# Patient Record
Sex: MALE | Race: OTHER | HISPANIC OR LATINO | URBAN - METROPOLITAN AREA
[De-identification: names, ages, dates, MRNs, and addresses within clinical notes are randomized per-mention and may not be internally consistent; named-entity substitution may affect disease eponyms.]

---

## 2017-04-12 ENCOUNTER — INPATIENT (INPATIENT)
Facility: HOSPITAL | Age: 27
LOS: 3 days | Discharge: HOME | DRG: 165 | End: 2017-04-16
Attending: THORACIC SURGERY (CARDIOTHORACIC VASCULAR SURGERY) | Admitting: THORACIC SURGERY (CARDIOTHORACIC VASCULAR SURGERY)
Payer: COMMERCIAL

## 2017-04-12 VITALS
HEART RATE: 86 BPM | DIASTOLIC BLOOD PRESSURE: 77 MMHG | OXYGEN SATURATION: 93 % | RESPIRATION RATE: 16 BRPM | SYSTOLIC BLOOD PRESSURE: 133 MMHG | TEMPERATURE: 98 F

## 2017-04-12 DIAGNOSIS — J93.9 PNEUMOTHORAX, UNSPECIFIED: ICD-10-CM

## 2017-04-12 LAB
ALBUMIN SERPL ELPH-MCNC: 4.2 G/DL — SIGNIFICANT CHANGE UP (ref 3.4–5)
ALP SERPL-CCNC: 73 U/L — SIGNIFICANT CHANGE UP (ref 40–120)
ALT FLD-CCNC: 24 U/L — SIGNIFICANT CHANGE UP (ref 12–42)
ANION GAP SERPL CALC-SCNC: 8 MMOL/L — LOW (ref 9–16)
APTT BLD: 30.1 SEC — SIGNIFICANT CHANGE UP (ref 27.5–37.4)
AST SERPL-CCNC: 23 U/L — SIGNIFICANT CHANGE UP (ref 15–37)
BASOPHILS NFR BLD AUTO: 0.9 % — SIGNIFICANT CHANGE UP (ref 0–2)
BILIRUB SERPL-MCNC: 0.9 MG/DL — SIGNIFICANT CHANGE UP (ref 0.2–1.2)
BUN SERPL-MCNC: 15 MG/DL — SIGNIFICANT CHANGE UP (ref 7–23)
CALCIUM SERPL-MCNC: 9 MG/DL — SIGNIFICANT CHANGE UP (ref 8.5–10.5)
CHLORIDE SERPL-SCNC: 104 MMOL/L — SIGNIFICANT CHANGE UP (ref 96–108)
CK MB CFR SERPL CALC: <1 NG/ML — SIGNIFICANT CHANGE UP (ref 0.5–3.6)
CK SERPL-CCNC: 108 U/L — SIGNIFICANT CHANGE UP (ref 39–308)
CO2 SERPL-SCNC: 27 MMOL/L — SIGNIFICANT CHANGE UP (ref 22–31)
CREAT SERPL-MCNC: 0.96 MG/DL — SIGNIFICANT CHANGE UP (ref 0.5–1.3)
EOSINOPHIL NFR BLD AUTO: 9.7 % — HIGH (ref 0–6)
EXTRA BLUE TOP TUBE: SIGNIFICANT CHANGE UP
GLUCOSE SERPL-MCNC: 114 MG/DL — HIGH (ref 70–99)
HCT VFR BLD CALC: 38.4 % — LOW (ref 39–50)
HGB BLD-MCNC: 13.4 G/DL — SIGNIFICANT CHANGE UP (ref 13–17)
INR BLD: 0.96 — SIGNIFICANT CHANGE UP (ref 0.88–1.16)
LYMPHOCYTES # BLD AUTO: 42.1 % — SIGNIFICANT CHANGE UP (ref 13–44)
MCHC RBC-ENTMCNC: 31.9 PG — SIGNIFICANT CHANGE UP (ref 27–34)
MCHC RBC-ENTMCNC: 34.9 G/DL — SIGNIFICANT CHANGE UP (ref 32–36)
MCV RBC AUTO: 91.4 FL — SIGNIFICANT CHANGE UP (ref 80–100)
MONOCYTES NFR BLD AUTO: 7 % — SIGNIFICANT CHANGE UP (ref 2–14)
NEUTROPHILS NFR BLD AUTO: 40.3 % — LOW (ref 43–77)
PLATELET # BLD AUTO: 232 K/UL — SIGNIFICANT CHANGE UP (ref 150–400)
POTASSIUM SERPL-MCNC: 4 MMOL/L — SIGNIFICANT CHANGE UP (ref 3.5–5.3)
POTASSIUM SERPL-SCNC: 4 MMOL/L — SIGNIFICANT CHANGE UP (ref 3.5–5.3)
PROT SERPL-MCNC: 7.6 G/DL — SIGNIFICANT CHANGE UP (ref 6.4–8.2)
PROTHROM AB SERPL-ACNC: 10.7 SEC — SIGNIFICANT CHANGE UP (ref 9.8–12.7)
RBC # BLD: 4.2 M/UL — SIGNIFICANT CHANGE UP (ref 4.2–5.8)
RBC # FLD: 12.7 % — SIGNIFICANT CHANGE UP (ref 10.3–16.9)
SODIUM SERPL-SCNC: 139 MMOL/L — SIGNIFICANT CHANGE UP (ref 135–145)
TROPONIN I SERPL-MCNC: <0.015 NG/ML — SIGNIFICANT CHANGE UP (ref 0.01–0.04)
WBC # BLD: 5.5 K/UL — SIGNIFICANT CHANGE UP (ref 3.8–10.5)
WBC # FLD AUTO: 5.5 K/UL — SIGNIFICANT CHANGE UP (ref 3.8–10.5)

## 2017-04-12 PROCEDURE — 32650 THORACOSCOPY W/PLEURODESIS: CPT | Mod: AS

## 2017-04-12 PROCEDURE — 99291 CRITICAL CARE FIRST HOUR: CPT | Mod: 25

## 2017-04-12 PROCEDURE — 32666 THORACOSCOPY W/WEDGE RESECT: CPT | Mod: AS

## 2017-04-12 PROCEDURE — 93010 ELECTROCARDIOGRAM REPORT: CPT | Mod: NC

## 2017-04-12 PROCEDURE — 71250 CT THORAX DX C-: CPT | Mod: 26

## 2017-04-12 PROCEDURE — 71010: CPT | Mod: 26,59

## 2017-04-12 PROCEDURE — 71020: CPT | Mod: 26

## 2017-04-12 RX ORDER — ACETAMINOPHEN 500 MG
650 TABLET ORAL EVERY 6 HOURS
Qty: 0 | Refills: 0 | Status: DISCONTINUED | OUTPATIENT
Start: 2017-04-12 | End: 2017-04-14

## 2017-04-12 RX ORDER — KETOROLAC TROMETHAMINE 30 MG/ML
15 SYRINGE (ML) INJECTION EVERY 6 HOURS
Qty: 0 | Refills: 0 | Status: DISCONTINUED | OUTPATIENT
Start: 2017-04-12 | End: 2017-04-14

## 2017-04-12 RX ORDER — SODIUM CHLORIDE 9 MG/ML
3 INJECTION INTRAMUSCULAR; INTRAVENOUS; SUBCUTANEOUS EVERY 8 HOURS
Qty: 0 | Refills: 0 | Status: DISCONTINUED | OUTPATIENT
Start: 2017-04-12 | End: 2017-04-14

## 2017-04-12 RX ORDER — KETOROLAC TROMETHAMINE 30 MG/ML
30 SYRINGE (ML) INJECTION EVERY 6 HOURS
Qty: 0 | Refills: 0 | Status: DISCONTINUED | OUTPATIENT
Start: 2017-04-12 | End: 2017-04-14

## 2017-04-12 RX ORDER — FAMOTIDINE 10 MG/ML
20 INJECTION INTRAVENOUS
Qty: 0 | Refills: 0 | Status: DISCONTINUED | OUTPATIENT
Start: 2017-04-12 | End: 2017-04-14

## 2017-04-12 RX ORDER — KETOROLAC TROMETHAMINE 30 MG/ML
15 SYRINGE (ML) INJECTION ONCE
Qty: 0 | Refills: 0 | Status: DISCONTINUED | OUTPATIENT
Start: 2017-04-12 | End: 2017-04-12

## 2017-04-12 RX ADMIN — Medication 15 MILLIGRAM(S): at 11:24

## 2017-04-12 RX ADMIN — Medication 15 MILLIGRAM(S): at 17:49

## 2017-04-12 RX ADMIN — Medication 15 MILLIGRAM(S): at 21:15

## 2017-04-12 RX ADMIN — Medication 15 MILLIGRAM(S): at 18:04

## 2017-04-12 RX ADMIN — Medication 15 MILLIGRAM(S): at 21:41

## 2017-04-12 RX ADMIN — SODIUM CHLORIDE 3 MILLILITER(S): 9 INJECTION INTRAMUSCULAR; INTRAVENOUS; SUBCUTANEOUS at 21:00

## 2017-04-12 RX ADMIN — SODIUM CHLORIDE 3 MILLILITER(S): 9 INJECTION INTRAMUSCULAR; INTRAVENOUS; SUBCUTANEOUS at 16:08

## 2017-04-12 RX ADMIN — FAMOTIDINE 20 MILLIGRAM(S): 10 INJECTION INTRAVENOUS at 17:37

## 2017-04-12 RX ADMIN — Medication 15 MILLIGRAM(S): at 11:39

## 2017-04-12 NOTE — ED ADULT NURSE REASSESSMENT NOTE - NS ED NURSE REASSESS COMMENT FT1
Pt. was confirmed by xray to have pneumothorax, pt. was moved to resus room, MD Black and MD Solo at bedside, preprocedure checklist verified, VS stable, pt. is A&Ox3, calm, cooperative, CM in progress, O2 therapy via non-rebreather, pt. is being prepped for chest tube insertion, pt. tolerating well, will monitor.

## 2017-04-12 NOTE — ED PROVIDER NOTE - CRITICAL CARE PROVIDED
additional history taking/consultation with other physicians/direct patient care (not related to procedure)/documentation/interpretation of diagnostic studies

## 2017-04-12 NOTE — H&P ADULT - NSHPREVIEWOFSYSTEMS_GEN_ALL_CORE
Review of Systems  CONSTITUTIONAL:  Denies Fevers / chills, sweats, fatigue, weight loss, weight gain                                      NEURO:  Denies parathesias, seizures, syncope, confusion                                                                                EYES:  Denies Blurry vision, discharge, pain, loss of vision                                                                                    ENMT:  Denies Difficulty hearing, vertigo, dysphagia, epistaxis, recent dental work                                       CV:  + LEFT SIDED Sudden onset Chest pain. +RAPHAEL.  DENIES palpitations and  orthopnea                                                                             RESPIRATORY:  + SOB, Denies Wheezing,  cough / sputum, hemoptysis                                                                GI:  Denies Nausea, vommiting, diarrhea, constipation, melena, difficulty swallowing                                               : Denies Hematuria, dysuria, urgency, incontinence                                                                                         MUSKULOSKELETAL:  Denies arthritis, joint swelling, muscle weakness                                                             SKIN/BREAST:  Denies rash, itching, judy loss, masses                                                                                            PSYCH:  Denies depresion, anxiety, suicidal ideation                                                                                               HEME/LYMPH:  Denies bruises easily, enlarged lymph nodes, tender lymph nodes                                        ENDOCRINE:  Denies cold intolerance, heat intolerance, polydipsia

## 2017-04-12 NOTE — H&P ADULT - NSHPPHYSICALEXAM_GEN_ALL_CORE
Physical Exam  CONSTITUTIONAL:   AxOx3.  NAD On 100 NRB breathing comfortably.  NEURO:    Nonfocal               ENM:   MMM  CV:    S1S2, RRR. No R/M/G  RESP:  Absent breath sounds Lt.  Rt is CTA.  GI:  +BS, soft, NT/ND  : WNL  MUSKULOSKELETAL:   WNL  SKIN / BREAST:    WNL

## 2017-04-12 NOTE — ED PROVIDER NOTE - OBJECTIVE STATEMENT
27 y/o m with no pmh presents to ED c/o left sided sudden chest pain with sob radiating to his back. Healthy young man denies any recent URI, cough, fever, n,v, abd pain, PE, cardiac history, trauma, injury to the chest. Denies drug use.

## 2017-04-12 NOTE — ED ADULT TRIAGE NOTE - CHIEF COMPLAINT QUOTE
pt c/o 7/10 left sided CP radiating to the back with SOB since 630 this morning. Denies N/V palpitations or PMH

## 2017-04-12 NOTE — ED ADULT NURSE REASSESSMENT NOTE - NS ED NURSE REASSESS COMMENT FT1
Ct scan was done, pt. tolerated well, pt. is A&Ox3, breathing unlabored, no c/o pain, VS stable, cardiac monitor, suctioning, O2 via non-rebreather in progress, waiting for transporter to be transferred to admitting unit.

## 2017-04-12 NOTE — H&P ADULT - NSHPSOCIALHISTORY_GEN_ALL_CORE
Social History  Smoker:    NO         ETOH Use:   YES: Social  Ilicit Drug Use:   NO  Occupation:    Assistant Devices:   None   Lives with: Alone

## 2017-04-12 NOTE — ED ADULT NURSE REASSESSMENT NOTE - NS ED NURSE REASSESS COMMENT FT1
Tube insertion following sterile procedure guidelines completed, pigtale cath inserted Lt side 4th intercostal, connected to continuous suctioning, less than 1cc of serosanguinous fluid drained at present time, pt. tolerated procedure well, pt. is A&Ox3, cooperative, calm, communicates w/o difficulty, 100% O2 therapy and cardiac monitor continuous, VS stable, xray confirmed placement, will monitor.

## 2017-04-12 NOTE — ED ADULT NURSE NOTE - OBJECTIVE STATEMENT
Patient BIBA stating, "I was getting out of the shower this morning around 6:30 and I started having chest pain."  Patient is A&Ox3, in no visible acute distress, complaining of left sided 6/10 chest pain that increases with inspiration with radiation to back and SOB.  Patient denies any diaphoresis, dizziness, nausea, vomiting, diarrhea, fevers, chills or cough.  No PMH or home meds.  PIV placed, labs drawn and sent, EKG done, placed on cardiac monitor.  Will continue with plan of care. Patient BIBA stating, "I was getting out of the shower this morning around 6:30 and I started having chest pain."  Patient is A&Ox3, in no visible acute distress, complaining of left sided 6/10 chest pain that increases with inspiration with radiation to back and SOB.  Patient denies any diaphoresis, dizziness, nausea, vomiting, diarrhea, fevers, chills or cough.  Diminished breath sounds noted on L>R.  No PMH or home meds.  PIV placed, labs drawn and sent, EKG done, placed on cardiac monitor.  Will continue with plan of care.

## 2017-04-12 NOTE — ED ADULT NURSE REASSESSMENT NOTE - NS ED NURSE REASSESS COMMENT FT1
Chest tube insertion in progress, lidocaine 2% used for local anaesthesia, pt. tolerating well, sterile field maintained at all times, VS WNL.

## 2017-04-12 NOTE — ED PROVIDER NOTE - ATTENDING CONTRIBUTION TO CARE
25 yo male no pmh c/o acute onset L sided cp and sob.  No trauma, cough, fever, le pain/swelling, recent immobilization, tob use.  Well appearing, nad, nc/at, lung cta R, no bs L, heart reg, abd soft/nt, ext no c/c/e, no gross neuro deficits.  CXR w complete L ptx.  CT surg consulted and placed pigtail catheter; pt admitted to CT surg.

## 2017-04-12 NOTE — H&P ADULT - NSHPLABSRESULTS_GEN_ALL_CORE
Labs:                        13.4   5.5   )-----------( 232      ( 12 Apr 2017 07:51 )             38.4     04-12    139  |  104  |  15  ----------------------------<  114<H>  4.0   |  27  |  0.96    Ca    9.0      12 Apr 2017 07:51    TPro  7.6  /  Alb  4.2  /  TBili  0.9  /  DBili  x   /  AST  23  /  ALT  24  /  AlkPhos  73  04-12    PT/INR - ( 12 Apr 2017 09:20 )   PT: 10.7 sec;   INR: 0.96     PTT - ( 12 Apr 2017 09:20 )  PTT:30.1 sec      CARDIAC MARKERS ( 12 Apr 2017 07:51 )  <0.015 ng/mL / x     / 108 U/L / x     / <1.0 ng/mL

## 2017-04-12 NOTE — H&P ADULT - HISTORY OF PRESENT ILLNESS
27 y/o male with no PMHx presents to ER this am with complaints of sudden onset progressive left sided chest pain this morning.  Pt complaints of progressive worsening SOB, denies fever, HA, N/V, diaphoresis.

## 2017-04-12 NOTE — ED PROVIDER NOTE - MEDICAL DECISION MAKING DETAILS
Patient with sudden onset of pneumothorax ct tube placed by CT surgery. Admitted for observation. Well appearing, NAD and VSS.

## 2017-04-12 NOTE — PROCEDURE NOTE - NSPROCDETAILS_GEN_ALL_CORE
sterile dressing applied/Seldinger technique/secured in place/dressing applied/thoracostomy tube placed percutaneously

## 2017-04-12 NOTE — H&P ADULT - PROBLEM SELECTOR PLAN 1
Admit to Thoracic Surgery.  [address].  Dr. Black  Placement of left pigtail catheter.  CT to suction.  CT Chest non contrast to evaluate for blebs.  GI prophylaxis, DVT prophylaxis. Ambulation. Regular diet.  Continue oxygen supplementation.

## 2017-04-12 NOTE — PROCEDURE NOTE - NSPOSTCAREGUIDE_GEN_A_CORE
Care for catheter as per unit/ICU protocols/Instructed patient/caregiver regarding signs and symptoms of infection/Verbal/written post procedure instructions were given to patient/caregiver/Keep the cast/splint/dressing clean and dry

## 2017-04-13 ENCOUNTER — RESULT REVIEW (OUTPATIENT)
Age: 27
End: 2017-04-13

## 2017-04-13 PROBLEM — Z00.00 ENCOUNTER FOR PREVENTIVE HEALTH EXAMINATION: Status: ACTIVE | Noted: 2017-04-13

## 2017-04-13 LAB
ANION GAP SERPL CALC-SCNC: 7 MMOL/L — LOW (ref 9–16)
BLD GP AB SCN SERPL QL: NEGATIVE — SIGNIFICANT CHANGE UP
BUN SERPL-MCNC: 19 MG/DL — SIGNIFICANT CHANGE UP (ref 7–23)
CALCIUM SERPL-MCNC: 9.3 MG/DL — SIGNIFICANT CHANGE UP (ref 8.5–10.5)
CHLORIDE SERPL-SCNC: 106 MMOL/L — SIGNIFICANT CHANGE UP (ref 96–108)
CO2 SERPL-SCNC: 26 MMOL/L — SIGNIFICANT CHANGE UP (ref 22–31)
CREAT SERPL-MCNC: 0.88 MG/DL — SIGNIFICANT CHANGE UP (ref 0.5–1.3)
GLUCOSE SERPL-MCNC: 100 MG/DL — HIGH (ref 70–99)
HCT VFR BLD CALC: 39.5 % — SIGNIFICANT CHANGE UP (ref 39–50)
HGB BLD-MCNC: 13.4 G/DL — SIGNIFICANT CHANGE UP (ref 13–17)
MAGNESIUM SERPL-MCNC: 2.3 MG/DL — SIGNIFICANT CHANGE UP (ref 1.6–2.4)
MCHC RBC-ENTMCNC: 31.2 PG — SIGNIFICANT CHANGE UP (ref 27–34)
MCHC RBC-ENTMCNC: 33.9 G/DL — SIGNIFICANT CHANGE UP (ref 32–36)
MCV RBC AUTO: 92.1 FL — SIGNIFICANT CHANGE UP (ref 80–100)
PLATELET # BLD AUTO: 242 K/UL — SIGNIFICANT CHANGE UP (ref 150–400)
POTASSIUM SERPL-MCNC: 4.2 MMOL/L — SIGNIFICANT CHANGE UP (ref 3.5–5.3)
POTASSIUM SERPL-SCNC: 4.2 MMOL/L — SIGNIFICANT CHANGE UP (ref 3.5–5.3)
RBC # BLD: 4.29 M/UL — SIGNIFICANT CHANGE UP (ref 4.2–5.8)
RBC # FLD: 12.9 % — SIGNIFICANT CHANGE UP (ref 10.3–16.9)
RH IG SCN BLD-IMP: POSITIVE — SIGNIFICANT CHANGE UP
SODIUM SERPL-SCNC: 139 MMOL/L — SIGNIFICANT CHANGE UP (ref 135–145)
WBC # BLD: 6.9 K/UL — SIGNIFICANT CHANGE UP (ref 3.8–10.5)
WBC # FLD AUTO: 6.9 K/UL — SIGNIFICANT CHANGE UP (ref 3.8–10.5)

## 2017-04-13 PROCEDURE — 94010 BREATHING CAPACITY TEST: CPT | Mod: 26

## 2017-04-13 PROCEDURE — 71010: CPT | Mod: 26

## 2017-04-13 RX ORDER — CHLORHEXIDINE GLUCONATE 213 G/1000ML
15 SOLUTION TOPICAL ONCE
Qty: 0 | Refills: 0 | Status: COMPLETED | OUTPATIENT
Start: 2017-04-13 | End: 2017-04-14

## 2017-04-13 RX ORDER — CHLORHEXIDINE GLUCONATE 213 G/1000ML
1 SOLUTION TOPICAL ONCE
Qty: 0 | Refills: 0 | Status: COMPLETED | OUTPATIENT
Start: 2017-04-13 | End: 2017-04-13

## 2017-04-13 RX ADMIN — Medication 30 MILLIGRAM(S): at 23:58

## 2017-04-13 RX ADMIN — Medication 30 MILLIGRAM(S): at 13:14

## 2017-04-13 RX ADMIN — Medication 30 MILLIGRAM(S): at 06:52

## 2017-04-13 RX ADMIN — Medication 30 MILLIGRAM(S): at 23:55

## 2017-04-13 RX ADMIN — Medication 30 MILLIGRAM(S): at 00:10

## 2017-04-13 RX ADMIN — FAMOTIDINE 20 MILLIGRAM(S): 10 INJECTION INTRAVENOUS at 18:37

## 2017-04-13 RX ADMIN — Medication 30 MILLIGRAM(S): at 00:40

## 2017-04-13 RX ADMIN — Medication 30 MILLIGRAM(S): at 13:56

## 2017-04-13 RX ADMIN — CHLORHEXIDINE GLUCONATE 1 APPLICATION(S): 213 SOLUTION TOPICAL at 22:37

## 2017-04-13 RX ADMIN — SODIUM CHLORIDE 3 MILLILITER(S): 9 INJECTION INTRAMUSCULAR; INTRAVENOUS; SUBCUTANEOUS at 13:58

## 2017-04-13 RX ADMIN — SODIUM CHLORIDE 3 MILLILITER(S): 9 INJECTION INTRAMUSCULAR; INTRAVENOUS; SUBCUTANEOUS at 21:39

## 2017-04-13 RX ADMIN — FAMOTIDINE 20 MILLIGRAM(S): 10 INJECTION INTRAVENOUS at 06:52

## 2017-04-13 RX ADMIN — SODIUM CHLORIDE 3 MILLILITER(S): 9 INJECTION INTRAMUSCULAR; INTRAVENOUS; SUBCUTANEOUS at 06:52

## 2017-04-13 RX ADMIN — Medication 30 MILLIGRAM(S): at 07:20

## 2017-04-14 ENCOUNTER — APPOINTMENT (OUTPATIENT)
Dept: THORACIC SURGERY | Facility: HOSPITAL | Age: 27
End: 2017-04-14

## 2017-04-14 LAB
ANION GAP SERPL CALC-SCNC: 7 MMOL/L — LOW (ref 9–16)
ANION GAP SERPL CALC-SCNC: 8 MMOL/L — LOW (ref 9–16)
APTT BLD: 33.8 SEC — SIGNIFICANT CHANGE UP (ref 27.5–37.4)
BASOPHILS NFR BLD AUTO: 0.2 % — SIGNIFICANT CHANGE UP (ref 0–2)
BLD GP AB SCN SERPL QL: NEGATIVE — SIGNIFICANT CHANGE UP
BUN SERPL-MCNC: 15 MG/DL — SIGNIFICANT CHANGE UP (ref 7–23)
BUN SERPL-MCNC: 17 MG/DL — SIGNIFICANT CHANGE UP (ref 7–23)
CALCIUM SERPL-MCNC: 8.7 MG/DL — SIGNIFICANT CHANGE UP (ref 8.5–10.5)
CALCIUM SERPL-MCNC: 8.9 MG/DL — SIGNIFICANT CHANGE UP (ref 8.5–10.5)
CHLORIDE SERPL-SCNC: 105 MMOL/L — SIGNIFICANT CHANGE UP (ref 96–108)
CHLORIDE SERPL-SCNC: 106 MMOL/L — SIGNIFICANT CHANGE UP (ref 96–108)
CO2 SERPL-SCNC: 26 MMOL/L — SIGNIFICANT CHANGE UP (ref 22–31)
CO2 SERPL-SCNC: 27 MMOL/L — SIGNIFICANT CHANGE UP (ref 22–31)
CREAT SERPL-MCNC: 0.93 MG/DL — SIGNIFICANT CHANGE UP (ref 0.5–1.3)
CREAT SERPL-MCNC: 1.04 MG/DL — SIGNIFICANT CHANGE UP (ref 0.5–1.3)
EOSINOPHIL NFR BLD AUTO: 0.4 % — SIGNIFICANT CHANGE UP (ref 0–6)
GLUCOSE SERPL-MCNC: 106 MG/DL — HIGH (ref 70–99)
GLUCOSE SERPL-MCNC: 97 MG/DL — SIGNIFICANT CHANGE UP (ref 70–99)
HCT VFR BLD CALC: 36.8 % — LOW (ref 39–50)
HCT VFR BLD CALC: 39.8 % — SIGNIFICANT CHANGE UP (ref 39–50)
HGB BLD-MCNC: 12.6 G/DL — LOW (ref 13–17)
HGB BLD-MCNC: 13.5 G/DL — SIGNIFICANT CHANGE UP (ref 13–17)
INR BLD: 1.09 — SIGNIFICANT CHANGE UP (ref 0.88–1.16)
LYMPHOCYTES # BLD AUTO: 3.3 % — LOW (ref 13–44)
MAGNESIUM SERPL-MCNC: 2 MG/DL — SIGNIFICANT CHANGE UP (ref 1.6–2.4)
MCHC RBC-ENTMCNC: 31 PG — SIGNIFICANT CHANGE UP (ref 27–34)
MCHC RBC-ENTMCNC: 31.5 PG — SIGNIFICANT CHANGE UP (ref 27–34)
MCHC RBC-ENTMCNC: 33.9 G/DL — SIGNIFICANT CHANGE UP (ref 32–36)
MCHC RBC-ENTMCNC: 34.2 G/DL — SIGNIFICANT CHANGE UP (ref 32–36)
MCV RBC AUTO: 91.3 FL — SIGNIFICANT CHANGE UP (ref 80–100)
MCV RBC AUTO: 92 FL — SIGNIFICANT CHANGE UP (ref 80–100)
MONOCYTES NFR BLD AUTO: 0.4 % — LOW (ref 2–14)
NEUTROPHILS NFR BLD AUTO: 95.7 % — HIGH (ref 43–77)
PLATELET # BLD AUTO: 211 K/UL — SIGNIFICANT CHANGE UP (ref 150–400)
PLATELET # BLD AUTO: 228 K/UL — SIGNIFICANT CHANGE UP (ref 150–400)
POTASSIUM SERPL-MCNC: 4 MMOL/L — SIGNIFICANT CHANGE UP (ref 3.5–5.3)
POTASSIUM SERPL-MCNC: 4.2 MMOL/L — SIGNIFICANT CHANGE UP (ref 3.5–5.3)
POTASSIUM SERPL-SCNC: 4 MMOL/L — SIGNIFICANT CHANGE UP (ref 3.5–5.3)
POTASSIUM SERPL-SCNC: 4.2 MMOL/L — SIGNIFICANT CHANGE UP (ref 3.5–5.3)
PROTHROM AB SERPL-ACNC: 12.1 SEC — SIGNIFICANT CHANGE UP (ref 9.8–12.7)
RBC # BLD: 4 M/UL — LOW (ref 4.2–5.8)
RBC # BLD: 4.36 M/UL — SIGNIFICANT CHANGE UP (ref 4.2–5.8)
RBC # FLD: 12.1 % — SIGNIFICANT CHANGE UP (ref 10.3–16.9)
RBC # FLD: 12.6 % — SIGNIFICANT CHANGE UP (ref 10.3–16.9)
RH IG SCN BLD-IMP: POSITIVE — SIGNIFICANT CHANGE UP
SODIUM SERPL-SCNC: 139 MMOL/L — SIGNIFICANT CHANGE UP (ref 135–145)
SODIUM SERPL-SCNC: 140 MMOL/L — SIGNIFICANT CHANGE UP (ref 135–145)
WBC # BLD: 15.7 K/UL — HIGH (ref 3.8–10.5)
WBC # BLD: 6.1 K/UL — SIGNIFICANT CHANGE UP (ref 3.8–10.5)
WBC # FLD AUTO: 15.7 K/UL — HIGH (ref 3.8–10.5)
WBC # FLD AUTO: 6.1 K/UL — SIGNIFICANT CHANGE UP (ref 3.8–10.5)

## 2017-04-14 PROCEDURE — 71010: CPT | Mod: 26

## 2017-04-14 PROCEDURE — 71010: CPT | Mod: 26,77

## 2017-04-14 RX ORDER — BUPIVACAINE 13.3 MG/ML
20 INJECTION, SUSPENSION, LIPOSOMAL INFILTRATION ONCE
Qty: 0 | Refills: 0 | Status: DISCONTINUED | OUTPATIENT
Start: 2017-04-14 | End: 2017-04-14

## 2017-04-14 RX ORDER — HEPARIN SODIUM 5000 [USP'U]/ML
5000 INJECTION INTRAVENOUS; SUBCUTANEOUS EVERY 8 HOURS
Qty: 0 | Refills: 0 | Status: DISCONTINUED | OUTPATIENT
Start: 2017-04-14 | End: 2017-04-16

## 2017-04-14 RX ORDER — ACETAMINOPHEN 500 MG
1000 TABLET ORAL ONCE
Qty: 0 | Refills: 0 | Status: COMPLETED | OUTPATIENT
Start: 2017-04-14 | End: 2017-04-14

## 2017-04-14 RX ORDER — CEFAZOLIN SODIUM 1 G
1000 VIAL (EA) INJECTION EVERY 8 HOURS
Qty: 0 | Refills: 0 | Status: COMPLETED | OUTPATIENT
Start: 2017-04-14 | End: 2017-04-15

## 2017-04-14 RX ORDER — FAMOTIDINE 10 MG/ML
20 INJECTION INTRAVENOUS EVERY 12 HOURS
Qty: 0 | Refills: 0 | Status: DISCONTINUED | OUTPATIENT
Start: 2017-04-14 | End: 2017-04-16

## 2017-04-14 RX ORDER — SODIUM CHLORIDE 9 MG/ML
500 INJECTION, SOLUTION INTRAVENOUS
Qty: 0 | Refills: 0 | Status: DISCONTINUED | OUTPATIENT
Start: 2017-04-14 | End: 2017-04-15

## 2017-04-14 RX ADMIN — Medication 100 MILLIGRAM(S): at 22:59

## 2017-04-14 RX ADMIN — HEPARIN SODIUM 5000 UNIT(S): 5000 INJECTION INTRAVENOUS; SUBCUTANEOUS at 21:50

## 2017-04-14 RX ADMIN — SODIUM CHLORIDE 3 MILLILITER(S): 9 INJECTION INTRAMUSCULAR; INTRAVENOUS; SUBCUTANEOUS at 05:01

## 2017-04-14 RX ADMIN — Medication 1000 MILLIGRAM(S): at 22:30

## 2017-04-14 RX ADMIN — FAMOTIDINE 20 MILLIGRAM(S): 10 INJECTION INTRAVENOUS at 17:29

## 2017-04-14 RX ADMIN — FAMOTIDINE 20 MILLIGRAM(S): 10 INJECTION INTRAVENOUS at 05:05

## 2017-04-14 RX ADMIN — Medication 400 MILLIGRAM(S): at 21:50

## 2017-04-14 RX ADMIN — CHLORHEXIDINE GLUCONATE 15 MILLILITER(S): 213 SOLUTION TOPICAL at 05:05

## 2017-04-15 LAB
ANION GAP SERPL CALC-SCNC: 7 MMOL/L — LOW (ref 9–16)
BASOPHILS NFR BLD AUTO: 0.1 % — SIGNIFICANT CHANGE UP (ref 0–2)
BUN SERPL-MCNC: 16 MG/DL — SIGNIFICANT CHANGE UP (ref 7–23)
CALCIUM SERPL-MCNC: 9.3 MG/DL — SIGNIFICANT CHANGE UP (ref 8.5–10.5)
CHLORIDE SERPL-SCNC: 104 MMOL/L — SIGNIFICANT CHANGE UP (ref 96–108)
CO2 SERPL-SCNC: 25 MMOL/L — SIGNIFICANT CHANGE UP (ref 22–31)
CREAT SERPL-MCNC: 0.89 MG/DL — SIGNIFICANT CHANGE UP (ref 0.5–1.3)
EOSINOPHIL NFR BLD AUTO: 0.1 % — SIGNIFICANT CHANGE UP (ref 0–6)
GLUCOSE SERPL-MCNC: 129 MG/DL — HIGH (ref 70–99)
HCT VFR BLD CALC: 36.4 % — LOW (ref 39–50)
HGB BLD-MCNC: 12.2 G/DL — LOW (ref 13–17)
LYMPHOCYTES # BLD AUTO: 7.6 % — LOW (ref 13–44)
MCHC RBC-ENTMCNC: 30.3 PG — SIGNIFICANT CHANGE UP (ref 27–34)
MCHC RBC-ENTMCNC: 33.5 G/DL — SIGNIFICANT CHANGE UP (ref 32–36)
MCV RBC AUTO: 90.5 FL — SIGNIFICANT CHANGE UP (ref 80–100)
MONOCYTES NFR BLD AUTO: 10 % — SIGNIFICANT CHANGE UP (ref 2–14)
NEUTROPHILS NFR BLD AUTO: 82.2 % — HIGH (ref 43–77)
PLATELET # BLD AUTO: 237 K/UL — SIGNIFICANT CHANGE UP (ref 150–400)
POTASSIUM SERPL-MCNC: 4.2 MMOL/L — SIGNIFICANT CHANGE UP (ref 3.5–5.3)
POTASSIUM SERPL-SCNC: 4.2 MMOL/L — SIGNIFICANT CHANGE UP (ref 3.5–5.3)
RBC # BLD: 4.02 M/UL — LOW (ref 4.2–5.8)
RBC # FLD: 12.4 % — SIGNIFICANT CHANGE UP (ref 10.3–16.9)
SODIUM SERPL-SCNC: 136 MMOL/L — SIGNIFICANT CHANGE UP (ref 135–145)
WBC # BLD: 15 K/UL — HIGH (ref 3.8–10.5)
WBC # FLD AUTO: 15 K/UL — HIGH (ref 3.8–10.5)

## 2017-04-15 PROCEDURE — 71010: CPT | Mod: 26

## 2017-04-15 RX ORDER — SENNA PLUS 8.6 MG/1
2 TABLET ORAL AT BEDTIME
Qty: 0 | Refills: 0 | Status: DISCONTINUED | OUTPATIENT
Start: 2017-04-15 | End: 2017-04-16

## 2017-04-15 RX ORDER — DOCUSATE SODIUM 100 MG
100 CAPSULE ORAL THREE TIMES A DAY
Qty: 0 | Refills: 0 | Status: DISCONTINUED | OUTPATIENT
Start: 2017-04-15 | End: 2017-04-16

## 2017-04-15 RX ADMIN — Medication 100 MILLIGRAM(S): at 22:54

## 2017-04-15 RX ADMIN — SENNA PLUS 2 TABLET(S): 8.6 TABLET ORAL at 22:54

## 2017-04-15 RX ADMIN — Medication 100 MILLIGRAM(S): at 06:37

## 2017-04-15 RX ADMIN — HEPARIN SODIUM 5000 UNIT(S): 5000 INJECTION INTRAVENOUS; SUBCUTANEOUS at 06:38

## 2017-04-15 RX ADMIN — FAMOTIDINE 20 MILLIGRAM(S): 10 INJECTION INTRAVENOUS at 06:38

## 2017-04-15 RX ADMIN — FAMOTIDINE 20 MILLIGRAM(S): 10 INJECTION INTRAVENOUS at 17:24

## 2017-04-15 RX ADMIN — HEPARIN SODIUM 5000 UNIT(S): 5000 INJECTION INTRAVENOUS; SUBCUTANEOUS at 22:54

## 2017-04-15 RX ADMIN — HEPARIN SODIUM 5000 UNIT(S): 5000 INJECTION INTRAVENOUS; SUBCUTANEOUS at 14:13

## 2017-04-15 NOTE — PROGRESS NOTE ADULT - ASSESSMENT
26y M with no significant PMH who was admitted to [address] from ED on 4/12/17 for sudden onset SOB, found to have large L ptx on CXR.  Pigtail was placed in the ED.  After failed clamp trial on 4/13/17, pt underwent uncomplicated L VATS MARIIA wedge resection and mechanical pleurodesis on 4/14/17.  Pt was transferred to  for post-op care then later transferred to [facility] that night with 1 CT in place with no air leak, on suction.  No acute events overnight.        1. Neuro: Pain management  -C/w current management  -No Toradol or NSAIDS    2. Respiratory: 96% on RA, s/p L VATS MARIIA wedge with mechanical pleurodesis for spontaneous ptx.   -CT to remain on suction for a total of 48h postop  -CXR in AM  -IS and ambulation  -Monitor SpO2 for respiratory status    3. CVS: HD Stable  -Monitor HR/BP/Tele    4. GI: Stable, tolerating PO Diet well  -PPX: Pepcid  -Started bowel regimen    5. /Renal: Cr: 0.89; No urinary issues  -Trend AM Cr  -Monitor I/O    6. ID: Afebrile, Asymptomatic  -No acute issues at this time, continue to monitor for SIRS    7. Endo: No hx of DM  -No acute issues at this time    8. Heme: H/H Stable  -DVT PPX: HSQ 5000 q8h  -CBC, chem in AM    Dispo: Home when medically ready.

## 2017-04-16 ENCOUNTER — TRANSCRIPTION ENCOUNTER (OUTPATIENT)
Age: 27
End: 2017-04-16

## 2017-04-16 VITALS — TEMPERATURE: 99 F

## 2017-04-16 LAB
ANION GAP SERPL CALC-SCNC: 4 MMOL/L — LOW (ref 9–16)
BUN SERPL-MCNC: 13 MG/DL — SIGNIFICANT CHANGE UP (ref 7–23)
CALCIUM SERPL-MCNC: 8.8 MG/DL — SIGNIFICANT CHANGE UP (ref 8.5–10.5)
CHLORIDE SERPL-SCNC: 105 MMOL/L — SIGNIFICANT CHANGE UP (ref 96–108)
CO2 SERPL-SCNC: 29 MMOL/L — SIGNIFICANT CHANGE UP (ref 22–31)
CREAT SERPL-MCNC: 0.92 MG/DL — SIGNIFICANT CHANGE UP (ref 0.5–1.3)
GLUCOSE SERPL-MCNC: 103 MG/DL — HIGH (ref 70–99)
HCT VFR BLD CALC: 35.2 % — LOW (ref 39–50)
HGB BLD-MCNC: 11.9 G/DL — LOW (ref 13–17)
MAGNESIUM SERPL-MCNC: 2 MG/DL — SIGNIFICANT CHANGE UP (ref 1.6–2.4)
MCHC RBC-ENTMCNC: 31.1 PG — SIGNIFICANT CHANGE UP (ref 27–34)
MCHC RBC-ENTMCNC: 33.8 G/DL — SIGNIFICANT CHANGE UP (ref 32–36)
MCV RBC AUTO: 91.9 FL — SIGNIFICANT CHANGE UP (ref 80–100)
PLATELET # BLD AUTO: 203 K/UL — SIGNIFICANT CHANGE UP (ref 150–400)
POTASSIUM SERPL-MCNC: 4.1 MMOL/L — SIGNIFICANT CHANGE UP (ref 3.5–5.3)
POTASSIUM SERPL-SCNC: 4.1 MMOL/L — SIGNIFICANT CHANGE UP (ref 3.5–5.3)
RBC # BLD: 3.83 M/UL — LOW (ref 4.2–5.8)
RBC # FLD: 12.7 % — SIGNIFICANT CHANGE UP (ref 10.3–16.9)
SODIUM SERPL-SCNC: 138 MMOL/L — SIGNIFICANT CHANGE UP (ref 135–145)
WBC # BLD: 10 K/UL — SIGNIFICANT CHANGE UP (ref 3.8–10.5)
WBC # FLD AUTO: 10 K/UL — SIGNIFICANT CHANGE UP (ref 3.8–10.5)

## 2017-04-16 PROCEDURE — 71010: CPT | Mod: 26

## 2017-04-16 PROCEDURE — 71010: CPT | Mod: 26,77

## 2017-04-16 RX ORDER — FAMOTIDINE 10 MG/ML
20 INJECTION INTRAVENOUS DAILY
Qty: 0 | Refills: 0 | Status: DISCONTINUED | OUTPATIENT
Start: 2017-04-16 | End: 2017-04-16

## 2017-04-16 RX ORDER — DOCUSATE SODIUM 100 MG
1 CAPSULE ORAL
Qty: 90 | Refills: 0 | OUTPATIENT
Start: 2017-04-16 | End: 2017-05-16

## 2017-04-16 RX ORDER — MULTIVIT WITH MIN/MFOLATE/K2 340-15/3 G
300 POWDER (GRAM) ORAL ONCE
Qty: 0 | Refills: 0 | Status: DISCONTINUED | OUTPATIENT
Start: 2017-04-16 | End: 2017-04-16

## 2017-04-16 RX ORDER — OXYCODONE HYDROCHLORIDE 5 MG/1
2 TABLET ORAL
Qty: 56 | Refills: 0 | OUTPATIENT
Start: 2017-04-16 | End: 2017-04-23

## 2017-04-16 RX ORDER — ACETAMINOPHEN 500 MG
2 TABLET ORAL
Qty: 240 | Refills: 0 | OUTPATIENT
Start: 2017-04-16 | End: 2017-05-16

## 2017-04-16 RX ORDER — SENNA PLUS 8.6 MG/1
2 TABLET ORAL
Qty: 60 | Refills: 0 | OUTPATIENT
Start: 2017-04-16 | End: 2017-05-16

## 2017-04-16 RX ORDER — ACETAMINOPHEN 500 MG
650 TABLET ORAL EVERY 6 HOURS
Qty: 0 | Refills: 0 | Status: DISCONTINUED | OUTPATIENT
Start: 2017-04-16 | End: 2017-04-16

## 2017-04-16 RX ORDER — FAMOTIDINE 10 MG/ML
1 INJECTION INTRAVENOUS
Qty: 30 | Refills: 0 | OUTPATIENT
Start: 2017-04-16 | End: 2017-05-16

## 2017-04-16 RX ORDER — LACTULOSE 10 G/15ML
10 SOLUTION ORAL ONCE
Qty: 0 | Refills: 0 | Status: COMPLETED | OUTPATIENT
Start: 2017-04-16 | End: 2017-04-16

## 2017-04-16 RX ADMIN — LACTULOSE 10 GRAM(S): 10 SOLUTION ORAL at 11:57

## 2017-04-16 RX ADMIN — Medication 100 MILLIGRAM(S): at 14:05

## 2017-04-16 RX ADMIN — Medication 100 MILLIGRAM(S): at 05:40

## 2017-04-16 RX ADMIN — FAMOTIDINE 20 MILLIGRAM(S): 10 INJECTION INTRAVENOUS at 05:40

## 2017-04-16 RX ADMIN — HEPARIN SODIUM 5000 UNIT(S): 5000 INJECTION INTRAVENOUS; SUBCUTANEOUS at 05:40

## 2017-04-16 RX ADMIN — HEPARIN SODIUM 5000 UNIT(S): 5000 INJECTION INTRAVENOUS; SUBCUTANEOUS at 14:05

## 2017-04-16 NOTE — PROGRESS NOTE ADULT - SUBJECTIVE AND OBJECTIVE BOX
Planned Date of Surgery: 4/14/17                                                                                                                 Surgeon: Dr. Black     Procedure: Left VATs,  mechanical pleurodesis, possible blebectomy     Objective:   26y Male admitted for spontaneous PTX s/p left pigtail placement. Pt fail clamp trial this AM, CXR revealed PTX and pt felt short of breath. Breathing improved once CT was placed back to suction. Pt c/o localized CT site pain.     PE:   General: sitting upright in bed, NAD  Neuro: no focal deficits, ambulating well   CV: RRR, no m/r/g  Lungs: slight decreased BS on left upper lung fields, all other lung fields CTA  GI: NT-ND, soft   PV: no edema or calf tenderness b/l   Tubes: left CT to suction     PAST MEDICAL & SURGICAL HISTORY:  No pertinent past medical history  No significant past surgical history    No Known Allergies    MEDICATIONS  (STANDING):  sodium chloride 0.9% lock flush 3milliLiter(s) IV Push every 8 hours  famotidine    Tablet 20milliGRAM(s) Oral two times a day    MEDICATIONS  (PRN):  ketorolac   Injectable 15milliGRAM(s) IV Push every 6 hours PRN Moderate Pain (4 - 6)  ketorolac   Injectable 30milliGRAM(s) IV Push every 6 hours PRN Severe Pain (7 - 10)  acetaminophen   Tablet. 650milliGRAM(s) Oral every 6 hours PRN Mild Pain (1 - 3)  oxyCODONE  5 mG/acetaminophen 325 mG 1Tablet(s) Oral every 6 hours PRN Severe Pain (7 - 10)      Labs:                        13.4   6.9   )-----------( 242      ( 13 Apr 2017 07:20 )             39.5     04-13    139  |  106  |  19  ----------------------------<  100<H>  4.2   |  26  |  0.88    Ca    9.3      13 Apr 2017 07:20  Mg     2.3     04-13    TPro  7.6  /  Alb  4.2  /  TBili  0.9  /  DBili  x   /  AST  23  /  ALT  24  /  AlkPhos  73  04-12    PT/INR - ( 12 Apr 2017 09:20 )   PT: 10.7 sec;   INR: 0.96      PTT - ( 12 Apr 2017 09:20 )  PTT:30.1 sec    CARDIAC MARKERS ( 12 Apr 2017 07:51 )  <0.015 ng/mL / x     / 108 U/L / x     / <1.0 ng/mL    EKG: NSR rate 69    CXR: 4/13/17: PTX on clamp trial     CT Scans: 1. Left basal chest tube in situ. Small residual left pneumothorax. No   paraseptal bullae.    PFT's: PENDING    Consent in Chart? YES   Pre-op Orders Placed? YES   Blood Products Ordered? YES   NPO ordered? YES     Assessement: 26y Male admitted for spontaneous PTX s/p left pigtail placement. failed AM clamp trial, now pre-op for tomorrow AM for left VATS, pleurodesis, possible blebectomy     Plan:  -pre-op orders complete  -will need pt to sign consent   -T&S ordered   -Blood on hold for OR
Patient discussed on morning rounds with Dr. Black      Operation / Date: L VATS MARIIA Wedge resection and mechanical pleurodesis on 4/14/17    Surgeon: Dr. Black    SUBJECTIVE ASSESSMENT:  26y Male seen at bedside this AM.  Pt doing well with some mild incisional pain that is well managed on current regimen and some mild abdominal discomfort from constipation.  He does endorse flatulence. No acute issues overnight.  Currently denies HA, AMS, CP, palpitations, SOB, n/v/d, fever.      Update: pt was given Lactulose and Magnesium citrate and had a BM prior to discharge.     Hospital Course:  Mr. Smalls is a 26y M with no significant PMHx who presented to [facility] ED on 4/12/17 with sudden onset L sided CP and SOB.  Further workup revealed a large L spontaneous pneumothorax on CXR.  CT Surgery was contacted and a L pigtail catheter was placed at bedside, connected to pleurovac and LWS  with re-expansion of L lung demonstrated on follow up CXR.  Pt was admitted to [facility] for further evaluation and a CT chest was performed revealed small residual L pneumothorax with no paraseptal bullae.  On 4/13/17, chest tube was clamped in the morning, f/u CXR in the afternoon showed L apical pneumothorax and chest tube was placed back on suction.  Pt was scheduled for L VATS MARIIA wedge resection and mechanical pleurodesis and pre-op workup completed.  On 4/14/17, pt underwent L VATS MARIIA wedge resection with mechanical pleurodesis, uncomplicated intraop course.  Pt was extubated and transferred to CTICU with 1 chest tube placed to low wall suction for post-op care where he remained stable.  That night, pt was then transferred to [facility] for step down care.  On 4/15/17, Chest tube remained on suction, no obvious pneumothorax on AM CXR. Stable.  On 4/16/17, chest tube was clamped in the morning and f/u CXR remained stable.  Pt was given lactulose and magnesium citrate for constipation and had a BM in the early afternoon.  Per the request of Dr. Black, the chest tube was removed at bedside and secured with an occlusive dressing with tie down.  Follow up CXR showed no obvious pneumothorax.  Pt was medically cleared for discharge to home today with plan for follow up.  Dressing care, discharge instructions, and medications were discussed with the pt and the family prior to discharge, they understand and agree with plan for outpatient follow up in one week.     Vital Signs Last 24 Hrs  T(C): 37.1, Max: 37.4 (04-15 @ 21:59)  T(F): 98.8, Max: 99.4 (04-15 @ 21:59)  HR: 78 (55 - 78)  BP: 119/70 (111/66 - 127/83)  BP(mean): 89 (81 - 100)  RR: 16 (14 - 16)  SpO2: 98% (94% - 98%)  I&O's Detail  I & Os for 24h ending 16 Apr 2017 07:00  =============================================  IN:    Oral Fluid: 650 ml    Total IN: 650 ml  ---------------------------------------------  OUT:    Voided: 900 ml    Chest Tube: 96 ml    Total OUT: 996 ml  ---------------------------------------------  Total NET: -346 ml    I & Os for current day (as of 16 Apr 2017 17:02)  =============================================  IN:    Oral Fluid: 490 ml    Total IN: 490 ml  ---------------------------------------------  OUT:    Total OUT: 0 ml  ---------------------------------------------  Total NET: 490 ml      EPICARDIAL WIRES REMOVED: NA  TIE DOWNS REMOVED: Yes.    PHYSICAL EXAM:    General: Resting comfortably in bed, no acute distress.     Neurological: AAOx3, no AMS or focal deficits.     Cardiovascular: RRR, S1/S2, no m/r/g.     Respiratory:  No acute distress, fine rales at L base, CTA in all other lung fields.     Gastrointestinal: ND, NBS, non-TTP.    Extremities: Warm and well perfused, no calf ttp b/l.     Vascular: Pulses 2+ in b/l R, DP, PT.    Incision Sites: L VATS incisions: C/D/I    LABS:                        11.9   10.0  )-----------( 203      ( 16 Apr 2017 06:46 )             35.2       COUMADIN:  No.          04-16    138  |  105  |  13  ----------------------------<  103<H>  4.1   |  29  |  0.92    Ca    8.8      16 Apr 2017 06:46  Mg     2.0     04-16      MEDICATIONS  (STANDING):  See Med Rec      Discharge CXR:    ***PRELIMINARY REPORT***    EXAM:  XR CHEST 1 VIEW PORT IMMEDIATE                          PROCEDURE DATE:  04/16/2017               ***PRELIMINARY REPORT***      INTERPRETATION:  Resident preliminary report    CLINICAL INFORMATION: Chest tube removal. Rule out pneumothorax.    TECHNIQUE: A frontal view of the chest is dated 4/16/2017 4:55 PM     COMPARISON: Chest x-ray 4/16/2017 4:13 PM     FINDINGS: There has been interval removal of left-sided chest tube. No   pneumothorax. Cardiac mediastinal silhouette is unchanged.    IMPRESSION: Interval removal of left-sided chest tube, no pneumothorax.
Patient discussed on morning rounds with Dr. Black      Operation / Date: L VATS MARIIA wedge resection and mechanical pleurodesis on 4/15/17    SUBJECTIVE ASSESSMENT:  26y Male seen at bedside this AM.  Pt doing well with some mild incisional pain that is well managed on current regimen and some mild abdominal discomfort from constipation.  He does endorse flatulence. No acute issues overnight.  Currently denies HA, AMS, CP, palpitations, SOB, n/v/d, fever.        Vital Signs Last 24 Hrs  T(C): 36.7, Max: 37.4 (04-15 @ 21:59)  T(F): 98.1, Max: 99.4 (04-15 @ 21:59)  HR: 66 (55 - 76)  BP: 111/66 (111/66 - 127/83)  BP(mean): 81 (81 - 100)  RR: 15 (14 - 16)  SpO2: 98% (94% - 98%)  I&O's Detail  I & Os for 24h ending 16 Apr 2017 07:00  =============================================  IN:    Oral Fluid: 650 ml    Total IN: 650 ml  ---------------------------------------------  OUT:    Voided: 900 ml    Chest Tube: 96 ml    Total OUT: 996 ml  ---------------------------------------------  Total NET: -346 ml    I & Os for current day (as of 16 Apr 2017 12:29)  =============================================  IN:    Oral Fluid: 320 ml    Total IN: 320 ml  ---------------------------------------------  OUT:    Total OUT: 0 ml  ---------------------------------------------  Total NET: 320 ml      CHEST TUBE:  Yes. AIR LEAKS: No. Suction .   TIE DOWNS: Yes.  NATHAN: No.    PHYSICAL EXAM:    General: Resting comfortably in bed, no acute distress.     Neurological: AAOx3, no AMS or focal deficits.     Cardiovascular: RRR, S1/S2, no m/r/g.     Respiratory: +Pleural tube present on L lateral chest wall.  No acute distress, fine rales at L base, CTA in all other lung fields.     Gastrointestinal: ND, NBS, non-TTP.    Extremities: Warm and well perfused, no calf ttp b/l.     Vascular: Pulses 2+ in b/l R, DP, PT.    Incision Sites: L VATS incisions: C/D/I    LABS:                        11.9   10.0  )-----------( 203      ( 16 Apr 2017 06:46 )             35.2       COUMADIN:  No.    PT/INR - ( 14 Apr 2017 15:44 )   PT: 12.1 sec;   INR: 1.09          PTT - ( 14 Apr 2017 15:44 )  PTT:33.8 sec    04-16    138  |  105  |  13  ----------------------------<  103<H>  4.1   |  29  |  0.92    Ca    8.8      16 Apr 2017 06:46  Mg     2.0     04-16    MEDICATIONS  (STANDING):  famotidine Injectable 20milliGRAM(s) IV Push every 12 hours  heparin  Injectable 5000Unit(s) SubCutaneous every 8 hours  docusate sodium 100milliGRAM(s) Oral three times a day  senna 2Tablet(s) Oral at bedtime  lactulose Syrup 10Gram(s) Oral once    MEDICATIONS  (PRN):  oxyCODONE  5 mG/acetaminophen 325 mG 1Tablet(s) Oral every 6 hours PRN Moderate Pain (4 - 6)  oxyCODONE  5 mG/acetaminophen 325 mG 2Tablet(s) Oral every 6 hours PRN Severe Pain (7 - 10)        RADIOLOGY & ADDITIONAL TESTS:  EXAM:  XR CHEST 1 VIEW PORT ROUTINE                          PROCEDURE DATE:  04/16/2017         INTERPRETATION:  Clinical History: Shortness of breath    Portable examination of the chest demonstrates the heart to be within   normal limits in transverse diameter. No acute infiltrates. Left chest   tube noted.    Impression: No acute infiltrates
Patient discussed on morning rounds with Dr. Black      Operation / Date: L VATS MARIIA wedge resection and mechanical pleurodesis on 4/15/17    SUBJECTIVE ASSESSMENT:  26y Male seen at bedside this AM.  Pt doing well with some mild incisional pain that is well managed on current regimen.  No other acute complaints. No acute issues overnight.  Currently denies HA, AMS, CP, palpitations, SOB, n/v/d, fever.      Vital Signs Last 24 Hrs  T(C): 36.7, Max: 36.9 (04-15 @ 06:01)  T(F): 98.1, Max: 98.4 (04-15 @ 06:01)  HR: 76 (64 - 88)  BP: 122/78 (105/57 - 126/74)  BP(mean): 92 (76 - 95)  RR: 16 (15 - 17)  SpO2: 96% (95% - 97%)  I&O's Detail    I & Os for current day (as of 15 Apr 2017 18:17)  =============================================  IN:    Oral Fluid: 450 ml    IV PiggyBack: 200 ml    Total IN: 650 ml  ---------------------------------------------  OUT:    Voided: 1900 ml    Chest Tube: 140 ml    Total OUT: 2040 ml  ---------------------------------------------  Total NET: -1390 ml      CHEST TUBE:  Yes. AIR LEAKS: No. Suction .   TIE DOWNS: Yes.  NATHAN: No.    PHYSICAL EXAM:    General: Resting comfortably in bed, no acute distress.     Neurological: AAOx3, no AMS or focal deficits.     Cardiovascular: RRR, S1/S2, no m/r/g.     Respiratory: +Pleural tube present on L lateral chest wall.  No acute distress, fine rales at L base, CTA in all other lung fields.     Gastrointestinal: ND, NBS, non-TTP.    Extremities: Warm and well perfused, no calf ttp b/l.     Vascular: Pulses 2+ in b/l R, DP, PT.    Incision Sites: L VATS incisions: C/D/I    LABS:                        12.2   15.0  )-----------( 237      ( 15 Apr 2017 07:51 )             36.4       COUMADIN:  No.     PT/INR - ( 14 Apr 2017 15:44 )   PT: 12.1 sec;   INR: 1.09          PTT - ( 14 Apr 2017 15:44 )  PTT:33.8 sec    04-15    136  |  104  |  16  ----------------------------<  129<H>  4.2   |  25  |  0.89    Ca    9.3      15 Apr 2017 07:51  Mg     2.0     04-14    MEDICATIONS  (STANDING):  lactated ringers. 500milliLiter(s) IV Continuous <Continuous>  famotidine Injectable 20milliGRAM(s) IV Push every 12 hours  heparin  Injectable 5000Unit(s) SubCutaneous every 8 hours    MEDICATIONS  (PRN):  oxyCODONE  5 mG/acetaminophen 325 mG 1Tablet(s) Oral every 6 hours PRN Moderate Pain (4 - 6)  oxyCODONE  5 mG/acetaminophen 325 mG 2Tablet(s) Oral every 6 hours PRN Severe Pain (7 - 10)        RADIOLOGY & ADDITIONAL TESTS:      EXAM:  XR CHEST 1 VIEW PORT URGENT                          PROCEDURE DATE:  04/15/2017          INTERPRETATION:  Clinical History: Postop    Portable examination the chest demonstrates the heart to be within normal   limits in transverse diameter. No acute infiltrates. Left chest tube.    Impression: No acute infiltrates

## 2017-04-16 NOTE — DISCHARGE NOTE ADULT - CARE PLAN
Principal Discharge DX:	Pneumothorax on left  Goal:	Recover from Surgery  Instructions for follow-up, activity and diet:	-Dr. Black would like you to follow up with him within 1 week of discharge.  Because you have been discharged on the weekend, we were unable to schedule your follow up appointment.  Please call the office tomorrow to confirm your appointment.  The office is located at [facility], [facility], 4th floor. Call us with any questions #[phone], option 4.     -Please follow up with your primary care provider within 2 weeks of discharge.     -Walk daily as tolerated and use your incentive spirometer every hour.    -No driving or strenuous activity/exercise for 6 weeks, or until  cleared by your surgeon.    -Gently clean your incisions with anti-bacterial soap and water, pat  dry.  You may leave them open to air.    -Call your doctor if you have shortness of breath, chest pain not  relieved by pain medication, dizziness, fever >101.5, or increased  redness or drainage from incisions. Principal Discharge DX:	Pneumothorax on left  Goal:	Recover from Surgery  Instructions for follow-up, activity and diet:	-Dr. Black would like you to follow up with him within 1 week of discharge.  Because you have been discharged on the weekend, we were unable to schedule your follow up appointment.  Please call the office tomorrow to confirm your appointment.  The office is located at [facility], [facility], 4th floor. Call us with any questions #[phone], option 4.     -Please follow up with your primary care provider within 2 weeks of discharge.     -Walk daily as tolerated and use your incentive spirometer every hour.    -No driving or strenuous activity/exercise for 6 weeks, or until  cleared by your surgeon.    -Gently clean your incisions with anti-bacterial soap and water, pat  dry.  You may leave them open to air.    -Call your doctor if you have shortness of breath, chest pain not  relieved by pain medication, dizziness, fever >101.5, or increased  redness or drainage from incisions.

## 2017-04-16 NOTE — DISCHARGE NOTE ADULT - CARE PROVIDER_API CALL
Santos Black (MD), Thoracic Surgery  [address] [place] [address]  Phone: [phone]  Fax: [phone]

## 2017-04-16 NOTE — DISCHARGE NOTE ADULT - MEDICATION SUMMARY - MEDICATIONS TO TAKE
I will START or STAY ON the medications listed below when I get home from the hospital:    acetaminophen 325 mg oral tablet  -- 2 tab(s) by mouth every 6 hours, As needed, Moderate Pain (4 - 6) MDD:8  -- Indication: For Moderate pain    acetaminophen-oxyCODONE 325 mg-5 mg oral tablet  -- 2 tab(s) by mouth every 6 hours, As needed, Severe Pain (7 - 10) MDD:8  -- Indication: For Severe pain    famotidine 20 mg oral tablet  -- 1 tab(s) by mouth once a day  -- Indication: For Stomach health    docusate sodium 100 mg oral capsule  -- 1 cap(s) by mouth 3 times a day  -- Indication: For stool softener    senna oral tablet  -- 2 tab(s) by mouth once a day (at bedtime)  -- Indication: For stool softener

## 2017-04-16 NOTE — PROGRESS NOTE ADULT - ASSESSMENT
26y M with no significant PMH who was admitted to [address] from ED on 4/12/17 for sudden onset SOB, found to have large L ptx on CXR.  Pigtail was placed in the ED.  After failed clamp trial on 4/13/17, pt underwent uncomplicated L VATS MARIIA wedge resection and mechanical pleurodesis on 4/14/17.  Pt was transferred to  for post-op care then later transferred to [facility] that night with 1 CT in place with no air leak, on suction.  No acute events overnight.        1. Neuro: Pain management  -C/w current management  -No Toradol or NSAIDS    2. Respiratory: 96% on RA, s/p L VATS MARIIA wedge with mechanical pleurodesis for spontaneous ptx.   -CT on suction this AM. Per Dr. Black, clamp trial at noon, repeat CXR at 1600.  -CXR in AM  -IS and ambulation  -Monitor SpO2 for respiratory status    3. CVS: HD Stable  -Monitor HR/BP/Tele    4. GI: Stable, tolerating PO Diet well, constipation x 3 days with flatulence  -PPX: Pepcid  -C/w bowel regimen  -Lactulose this AM and prune juice.  Encouraged ambulation.      5. /Renal: Cr: 0.92; No urinary issues  -Trend AM Cr  -Monitor I/O    6. ID: Afebrile, Asymptomatic  -No acute issues at this time, continue to monitor for SIRS    7. Endo: No hx of DM  -No acute issues at this time    8. Heme: H/H Stable  -DVT PPX: HSQ 5000 q8h  -CBC, chem in AM    Dispo: Home when medically ready.

## 2017-04-16 NOTE — PROGRESS NOTE ADULT - ASSESSMENT
-Dr. Black would like you to follow up with him within 1 week of discharge.  Because you have been discharged on the weekend, we were unable to schedule your follow up appointment.  Please call the office tomorrow to confirm your appointment.  The office is located at [facility], [facility], 4th floor. Call us with any questions #[phone], option 4.     -Please follow up with your primary care provider within 2 weeks of discharge.

## 2017-04-16 NOTE — DISCHARGE NOTE ADULT - PLAN OF CARE
Recover from Surgery -Dr. Black would like you to follow up with him within 1 week of discharge.  Because you have been discharged on the weekend, we were unable to schedule your follow up appointment.  Please call the office tomorrow to confirm your appointment.  The office is located at [facility], [facility], 4th floor. Call us with any questions #[phone], option 4.     -Please follow up with your primary care provider within 2 weeks of discharge.     -Walk daily as tolerated and use your incentive spirometer every hour.    -No driving or strenuous activity/exercise for 6 weeks, or until  cleared by your surgeon.    -Gently clean your incisions with anti-bacterial soap and water, pat  dry.  You may leave them open to air.    -Call your doctor if you have shortness of breath, chest pain not  relieved by pain medication, dizziness, fever >101.5, or increased  redness or drainage from incisions.

## 2017-04-16 NOTE — DISCHARGE NOTE ADULT - CARE PROVIDERS DIRECT ADDRESSES
[email or web address]

## 2017-04-16 NOTE — DISCHARGE NOTE ADULT - HOSPITAL COURSE
Mr. Smalls is a 26y M with no significant PMHx who presented to [facility] ED on 4/12/17 with sudden onset L sided CP and SOB.  Further workup revealed a large L spontaneous pneumothorax on CXR.  CT Surgery was contacted and a L pigtail catheter was placed at bedside, connected to pleurovac and LWS  with re-expansion of L lung demonstrated on follow up CXR.  Pt was admitted to [facility] for further evaluation and a CT chest was performed revealed small residual L pneumothorax with no paraseptal bullae.  On 4/13/17, chest tube was clamped in the morning, f/u CXR in the afternoon showed L apical pneumothorax and chest tube was placed back on suction    4/12: Left pigtail placed.  CT Chest ordered to eval for bullous disease.  CT to suction.  -CT Chest: 1. Left basal chest tube in situ. Small residual left pneumothorax. No   paraseptal bullae.  4/13/17: pre-op tomorrow for left VATS, mechanical pleurodesis possible blenectomy. Failed clamp trial this AM placed back to suction. f/u AM T&S, pre-op orders complete. f/u PFTs and PM T&S  4/14: L VATS MARIIA wedge and mechanical pleurodesis, uncomplicated OR course.  Transferred to CTICU for post op care (PACU full) then to [facility], stable.  Pain control, CT to suction.   4/15: CT on suction, no air leak.  Pain well controlled.  Tolerating PO Diet, no acute issues. Mr. Smalls is a 26y M with no significant PMHx who presented to [facility] ED on 4/12/17 with sudden onset L sided CP and SOB.  Further workup revealed a large L spontaneous pneumothorax on CXR.  CT Surgery was contacted and a L pigtail catheter was placed at bedside, connected to pleurovac and LWS  with re-expansion of L lung demonstrated on follow up CXR.  Pt was admitted to [facility] for further evaluation and a CT chest was performed revealed small residual L pneumothorax with no paraseptal bullae.  On 4/13/17, chest tube was clamped in the morning, f/u CXR in the afternoon showed L apical pneumothorax and chest tube was placed back on suction.  Pt was scheduled for L VATS MARIIA wedge resection and mechanical pleurodesis and pre-op workup completed.  On 4/14/17, pt underwent L VATS MARIIA wedge resection with mechanical pleurodesis, uncomplicated intraop course.  Pt was extubated and transferred to CTICU with 1 chest tube placed to low wall suction for post-op care where he remained stable.  That night, pt was then transferred to [facility] for step down care.  On 4/15/17, Chest tube remained on suction, no obvious pneumothorax on AM CXR. Stable.  On 4/16/17, chest tube was clamped in the morning and f/u CXR remained stable.  Pt was given lactulose and magnesium citrate for constipation and had a BM in the early afternoon.  Per the request of Dr. Black, the chest tube was removed at bedside and secured with an occlusive dressing with tie down.  Follow up CXR showed no obvious pneumothorax.  Pt was medically cleared for discharge to home today with plan for follow up.  Dressing care, discharge instructions, and medications were discussed with the pt and the family prior to discharge, they understand and agree with plan for outpatient follow up in one week.

## 2017-04-17 PROCEDURE — 82553 CREATINE MB FRACTION: CPT

## 2017-04-17 PROCEDURE — 71045 X-RAY EXAM CHEST 1 VIEW: CPT

## 2017-04-17 PROCEDURE — 86900 BLOOD TYPING SEROLOGIC ABO: CPT

## 2017-04-17 PROCEDURE — 84484 ASSAY OF TROPONIN QUANT: CPT

## 2017-04-17 PROCEDURE — 88307 TISSUE EXAM BY PATHOLOGIST: CPT

## 2017-04-17 PROCEDURE — 32556 INSERT CATH PLEURA W/O IMAGE: CPT

## 2017-04-17 PROCEDURE — 83735 ASSAY OF MAGNESIUM: CPT

## 2017-04-17 PROCEDURE — 93005 ELECTROCARDIOGRAM TRACING: CPT | Mod: XU

## 2017-04-17 PROCEDURE — 85025 COMPLETE CBC W/AUTO DIFF WBC: CPT

## 2017-04-17 PROCEDURE — 85730 THROMBOPLASTIN TIME PARTIAL: CPT

## 2017-04-17 PROCEDURE — 86901 BLOOD TYPING SEROLOGIC RH(D): CPT

## 2017-04-17 PROCEDURE — 85027 COMPLETE CBC AUTOMATED: CPT

## 2017-04-17 PROCEDURE — 94150 VITAL CAPACITY TEST: CPT

## 2017-04-17 PROCEDURE — 71250 CT THORAX DX C-: CPT

## 2017-04-17 PROCEDURE — 80048 BASIC METABOLIC PNL TOTAL CA: CPT

## 2017-04-17 PROCEDURE — 99291 CRITICAL CARE FIRST HOUR: CPT | Mod: 25

## 2017-04-17 PROCEDURE — 86850 RBC ANTIBODY SCREEN: CPT

## 2017-04-17 PROCEDURE — 36415 COLL VENOUS BLD VENIPUNCTURE: CPT

## 2017-04-17 PROCEDURE — 71046 X-RAY EXAM CHEST 2 VIEWS: CPT

## 2017-04-17 PROCEDURE — 80053 COMPREHEN METABOLIC PANEL: CPT

## 2017-04-17 PROCEDURE — 82550 ASSAY OF CK (CPK): CPT

## 2017-04-17 PROCEDURE — 85610 PROTHROMBIN TIME: CPT

## 2017-04-18 DIAGNOSIS — K59.00 CONSTIPATION, UNSPECIFIED: ICD-10-CM

## 2017-04-18 DIAGNOSIS — J93.83 OTHER PNEUMOTHORAX: ICD-10-CM

## 2017-04-18 RX ORDER — FAMOTIDINE 20 MG/1
20 TABLET, FILM COATED ORAL DAILY
Refills: 0 | Status: ACTIVE | COMMUNITY

## 2017-04-18 RX ORDER — OXYCODONE AND ACETAMINOPHEN 5; 325 MG/1; MG/1
5-325 TABLET ORAL
Refills: 0 | Status: ACTIVE | COMMUNITY

## 2017-04-18 RX ORDER — ACETAMINOPHEN 325 MG/1
325 TABLET ORAL
Refills: 0 | Status: ACTIVE | COMMUNITY

## 2017-04-20 LAB — SURGICAL PATHOLOGY STUDY: SIGNIFICANT CHANGE UP

## 2017-04-21 ENCOUNTER — APPOINTMENT (OUTPATIENT)
Dept: THORACIC SURGERY | Facility: CLINIC | Age: 27
End: 2017-04-21

## 2017-04-21 ENCOUNTER — OUTPATIENT (OUTPATIENT)
Dept: OUTPATIENT SERVICES | Facility: HOSPITAL | Age: 27
LOS: 1 days | End: 2017-04-21
Payer: COMMERCIAL

## 2017-04-21 VITALS
DIASTOLIC BLOOD PRESSURE: 79 MMHG | TEMPERATURE: 98.1 F | OXYGEN SATURATION: 97 % | HEART RATE: 63 BPM | WEIGHT: 171 LBS | RESPIRATION RATE: 17 BRPM | SYSTOLIC BLOOD PRESSURE: 112 MMHG

## 2017-04-21 PROCEDURE — 71046 X-RAY EXAM CHEST 2 VIEWS: CPT

## 2017-04-21 PROCEDURE — 71020: CPT | Mod: 26

## 2017-05-05 ENCOUNTER — OUTPATIENT (OUTPATIENT)
Dept: OUTPATIENT SERVICES | Facility: HOSPITAL | Age: 27
LOS: 1 days | End: 2017-05-05
Payer: COMMERCIAL

## 2017-05-05 ENCOUNTER — APPOINTMENT (OUTPATIENT)
Dept: THORACIC SURGERY | Facility: CLINIC | Age: 27
End: 2017-05-05

## 2017-05-05 VITALS
SYSTOLIC BLOOD PRESSURE: 118 MMHG | DIASTOLIC BLOOD PRESSURE: 73 MMHG | WEIGHT: 172 LBS | TEMPERATURE: 98.6 F | HEART RATE: 68 BPM | RESPIRATION RATE: 18 BRPM | OXYGEN SATURATION: 98 %

## 2017-05-05 DIAGNOSIS — Z09 ENCOUNTER FOR FOLLOW-UP EXAMINATION AFTER COMPLETED TREATMENT FOR CONDITIONS OTHER THAN MALIGNANT NEOPLASM: ICD-10-CM

## 2017-05-05 DIAGNOSIS — J93.9 PNEUMOTHORAX, UNSPECIFIED: ICD-10-CM

## 2017-05-05 PROCEDURE — 71046 X-RAY EXAM CHEST 2 VIEWS: CPT

## 2017-05-05 PROCEDURE — 71020: CPT | Mod: 26

## 2017-05-05 RX ORDER — DOCUSATE SODIUM 100 MG/1
100 CAPSULE ORAL 3 TIMES DAILY
Refills: 0 | Status: COMPLETED | COMMUNITY
End: 2017-05-05

## 2017-05-05 RX ORDER — SENNOSIDES 8.6 MG/1
CAPSULE, GELATIN COATED ORAL
Refills: 0 | Status: COMPLETED | COMMUNITY
End: 2017-05-05

## 2018-08-14 NOTE — ED ADULT NURSE NOTE - CAS TRG GEN SKIN CONDITION
Dry/Warm
Patient alert, oriented. Reporting depression and recent suicidality. Denies active suicidal plan or intent at this time.

## 2024-05-13 NOTE — PRE-OP CHECKLIST - SELECT TESTS ORDERED
Patient tolerated injection well. Patient advised to wait 20 minutes in the office following the injection. No signs/symptoms of reaction noted after 20 minutes.  Administrations This Visit       cyanocobalamin injection 1,000 mcg       Admin Date  05/13/2024  15:29 Action  Given Dose  1,000 mcg Route  IntraMUSCular Site  Deltoid Left Administered By  Nathalie Mondragon, RN    Ordering Provider: Matilda Edmondson APRN - NP    NDC: 88585-610-24    : [employer] [facility]    Patient Supplied?: No                    CXR/INR/CMP/Type and Screen/PT/PTT/Urinalysis